# Patient Record
Sex: MALE | Race: WHITE | Employment: FULL TIME | URBAN - METROPOLITAN AREA
[De-identification: names, ages, dates, MRNs, and addresses within clinical notes are randomized per-mention and may not be internally consistent; named-entity substitution may affect disease eponyms.]

---

## 2018-02-27 ENCOUNTER — HOSPITAL ENCOUNTER (OUTPATIENT)
Dept: PREADMISSION TESTING | Age: 43
Discharge: HOME | End: 2018-02-27

## 2018-02-27 VITALS
RESPIRATION RATE: 19 BRPM | TEMPERATURE: 98.3 F | DIASTOLIC BLOOD PRESSURE: 67 MMHG | OXYGEN SATURATION: 98 % | BODY MASS INDEX: 28.47 KG/M2 | HEART RATE: 73 BPM | WEIGHT: 192.24 LBS | SYSTOLIC BLOOD PRESSURE: 121 MMHG | HEIGHT: 69 IN

## 2018-02-27 RX ORDER — IBUPROFEN 200 MG
200 TABLET ORAL
COMMUNITY
End: 2018-03-01

## 2018-02-27 RX ORDER — DULOXETIN HYDROCHLORIDE 60 MG/1
60 CAPSULE, DELAYED RELEASE ORAL
COMMUNITY

## 2018-02-27 RX ORDER — ALBUTEROL SULFATE 0.83 MG/ML
1.25 SOLUTION RESPIRATORY (INHALATION)
COMMUNITY
End: 2018-02-27

## 2018-02-27 RX ORDER — ALBUTEROL SULFATE 90 UG/1
2 AEROSOL, METERED RESPIRATORY (INHALATION)
COMMUNITY

## 2018-02-27 NOTE — PERIOP NOTES
[facility]  PREOPERATIVE INSTRUCTIONS    Surgery Date:   Thursday, 3/1/18. Surgery arrival time given by surgeon: NO  (If [facility] staff will call you between 3pm - 7pm the day before surgery with your arrival time. If your surgery is on a Monday, we will call you the preceding Friday. Please call [phone] after 7pm if you did not receive your arrival time.) Verification phone call on Wednesday, 2/28/18. 1. Report  to the 2nd [address] [facility] on the day of your surgery. Bring your insurance card, photo identification, and any copayment (if applicable). 2. You must have a responsible adult to drive you home and stay with you the first 24 hours after surgery if you are going home the same day of your surgery. 3. Nothing to eat or drink after midnight the night before surgery. This means NO water, gum, mints, coffee, juice, etc.    4. MEDICATIONS TO TAKE THE MORNING OF SURGERY WITH A SIP OF WATER: Cymbalta and USe inhaler prior to coming to the hospital.  5. No alcoholic beverages 24 hours before and after your surgery. 6. If you are being admitted to the hospital,please leave personal belongings/luggage in your car until you have an assigned hospital room number. ( The hospital discharge time is 12 PM NOON. Your adult  should be at the hospital prior to the noon discharge time unless otherwise instructed.)   7. STOP Aspirin and/or any non-steroidal anti-inflammatory drugs Stop motrin today until after surgery. (i.e. Ibuprofen, Naproxen, Advil, Aleve) as directed by your surgeon. You may take Tylenol. Stop herbal supplements 1 week prior to  surgery. 8. If you are currently taking Plavix, Coumadin,or any other blood-thinning/ anticoagulant medication contact your surgeon for instructions. 9. Wear comfortable clothes. Wear your glasses instead of contacts. Please leave all money, jewelry and valuables at home. No make up, particularly mascara, the day of surgery.    10.  REMOVE ALL body piercings, rings,and jewelry and leave at home. Wear your hair loose or down, no pony-tails, buns, or any metal hair clips. 11. If you shower the morning of surgery, please do not apply any lotions, powders, or deodorants afterwards. Do not shave any body area within 24 hours of your surgery. 12. Please follow all instructions to avoid any potential surgical cancellation. 13. Should your physical condition change, (i.e. fever, cold, flu, etc.) please notify your surgeon as soon as possible. 14. It is important to be on time. If a situation occurs where you may be delayed, please call:  [phone] / [phone] on the day of surgery. 15. The Preadmission Testing staff can be reached at 21 [phone]. 16. Special instructions:  Free  parking. Bring completed medication form on day of surgery. 17. The patient was contacted  in person. He  verbalize  understanding of all instructions does not  need reinforcement.

## 2018-02-27 NOTE — H&P
PAT Pre-Op History & Physical    Patient: Laury Douglas                  MRN: [id number]          SSN: [id number]  YOB: 1975          Age: 38 y.o. Sex: male                Subjective:     Gretel Joya is a 38 y.o.  male who presents with history of three years ago suffering from intense back pain. He stated he used to press his shoulder against the door jamb to alleviate his lower back pain. Over the last year he developed left shoulder pain that has gotten progressively worse. Rates pain 2/10 to 4/10 and describes it as a deep ache. Pain is located on the outer aspect of his shoulder down his bicep. He states it will feel like his arm gets dislocated and has to pop back in. Sleep and driving make it worse. He is right hand dominant. He has failed injections, PT, pain gels, heat/ice application and NSAID. The patient was evaluated in the surgeon's office and it was determined that the most appropriate plan of care is to proceed with surgical intervention. Patient's PCP None      Past Medical History:   Diagnosis Date    Adverse effect of anesthesia     Tendecy to wake up being a little anxious.  Asthma     Last used Friday, 2/23/18.  Environmental allergies     Spinal stenosis       Past Surgical History:   Procedure Laterality Date    HX HEENT      wisdom teeth age 23    HX OTHER SURGICAL      3 steroid injections over the last 2 years to lower lumber araea.     AR EGD DELIVER THERMAL ENERGY SPHNCTR/CARDIA GERD      x2           No Known Allergies     Social History   Substance Use Topics    Smoking status: Former Smoker     Packs/day: 0.10     Years: 20.00     Types: Cigarettes     Quit date: 2/27/2013    Smokeless tobacco: Never Used    Alcohol use 1.8 oz/week     2 Glasses of wine, 1 Cans of beer per week      Comment: once a week      History   Drug Use No     Family History   Problem Relation Age of Onset    Asthma Mother     Heart Disease Father Bypass    Other Father      Smoker    No Known Problems Sister     No Known Problems Brother     No Known Problems Sister     No Known Problems Brother          Review of Systems    Patient denies difficulty swallowing, mouth sores, or loose teeth. Patient denies any recent dental procedures or any planned prior to surgery. Patient denies chest pain, tightness, pain radiating down left arm, palpitations. Denies dizziness, visual disturbances, or lightheadedness. Patient denies shortness of breath, wheezing, cough, fever, or chills. Patient denies diarrhea, constipation, or abdominal pain. Patient denies urinary problems including dysuria, hesitancy, urgency, or incontinence. Denies skin breakdown, rashes, insect bites or open area. Objective:   Patient Vitals for the past 24 hrs:   Temp Pulse Resp BP SpO2   18 1510 98.3 °F (36.8 °C) 73 19 121/67 98 %     Temp (24hrs), Av.3 °F (36.8 °C), Min:98.3 °F (36.8 °C), Max:98.3 °F (36.8 °C)    Body mass index is 28.39 kg/(m^2). Wt Readings from Last 1 Encounters:   18 87.2 kg (192 lb 3.9 oz)        Physical Exam:     General: Pleasant,  cooperative, no apparent distress, appears stated age. Eyes: Conjunctivae/corneas clear. EOMs intact. Nose: Nares normal.   Mouth/Throat: Lips, mucosa, and tongue normal. Teeth and gums normal.   Lungs: Clear to auscultation bilaterally. Heart: Regular rate and rhythm, S1, S2 normal. No murmur, click, rub or gallop. Abdomen: Soft, non-tender. Bowel sounds normal. No distention. Musculoskeletal:  Popping sound to left shoulder, limited ROM. Extremities:  Extremities normal, atraumatic, no cyanosis or edema. Calves                                 supple, non tender to palpation. Pulses: 2+ and symmetric bilateral upper extremities. Cap. refill <2 seconds   Skin: Skin color, texture, turgor normal. No visible open areas, examined fully clothed   Neurologic: CN II-XII grossly intact.   Alert and oriented x3. Labs: None ordered    Assessment:     LEFT SHOULDER IMPINGMENT    Plan:     Scheduled for LEFT SHOULDER ARTHROSCOPIC SUBACROMIAL DECOMPRESSION DISTAL CLAVICLE EXCISION    No labs or EKG needed per anesthesia protocol.      Anjelica Chávez NP

## 2018-02-28 ENCOUNTER — ANESTHESIA EVENT (OUTPATIENT)
Dept: SURGERY | Age: 43
End: 2018-02-28
Payer: COMMERCIAL

## 2018-03-01 ENCOUNTER — ANESTHESIA (OUTPATIENT)
Dept: SURGERY | Age: 43
End: 2018-03-01
Payer: COMMERCIAL

## 2018-03-01 ENCOUNTER — HOSPITAL ENCOUNTER (OUTPATIENT)
Age: 43
Setting detail: OUTPATIENT SURGERY
Discharge: HOME | End: 2018-03-01
Attending: ORTHOPAEDIC SURGERY | Admitting: ORTHOPAEDIC SURGERY
Payer: COMMERCIAL

## 2018-03-01 VITALS
DIASTOLIC BLOOD PRESSURE: 84 MMHG | OXYGEN SATURATION: 100 % | WEIGHT: 192.24 LBS | TEMPERATURE: 98.1 F | HEART RATE: 52 BPM | SYSTOLIC BLOOD PRESSURE: 115 MMHG | BODY MASS INDEX: 28.47 KG/M2 | RESPIRATION RATE: 11 BRPM | HEIGHT: 69 IN

## 2018-03-01 PROCEDURE — 77030020782 HC GWN BAIR PAWS FLX 3M -B

## 2018-03-01 PROCEDURE — 76210000050 HC AMBSU PH II REC 0.5 TO 1 HR: Performed by: ORTHOPAEDIC SURGERY

## 2018-03-01 PROCEDURE — 76030000001 HC AMB SURG OR TIME 1 TO 1.5: Performed by: ORTHOPAEDIC SURGERY

## 2018-03-01 PROCEDURE — 76060000062 HC AMB SURG ANES 1 TO 1.5 HR: Performed by: ORTHOPAEDIC SURGERY

## 2018-03-01 PROCEDURE — 74011000250 HC RX REV CODE- 250

## 2018-03-01 PROCEDURE — 77030018835 HC SOL IRR LR ICUM -A: Performed by: ORTHOPAEDIC SURGERY

## 2018-03-01 PROCEDURE — A4565 SLINGS: HCPCS | Performed by: ORTHOPAEDIC SURGERY

## 2018-03-01 PROCEDURE — 77030014154 HC WRP CLD THER BREG -C: Performed by: ORTHOPAEDIC SURGERY

## 2018-03-01 PROCEDURE — 77030003601 HC NDL NRV BLK BBMI -A

## 2018-03-01 PROCEDURE — 74011250636 HC RX REV CODE- 250/636: Performed by: ANESTHESIOLOGY

## 2018-03-01 PROCEDURE — 64415 NJX AA&/STRD BRCH PLXS IMG: CPT

## 2018-03-01 PROCEDURE — 74011250636 HC RX REV CODE- 250/636

## 2018-03-01 PROCEDURE — 77030008496 HC TBNG ARTHSC IRR S&N -B: Performed by: ORTHOPAEDIC SURGERY

## 2018-03-01 PROCEDURE — 77030013234 HC TRACT SHLDR KT BIOM -B: Performed by: ORTHOPAEDIC SURGERY

## 2018-03-01 PROCEDURE — 76210000040 HC AMBSU PH I REC FIRST 0.5 HR: Performed by: ORTHOPAEDIC SURGERY

## 2018-03-01 PROCEDURE — 77030006878 HC BLD SHV FLL RAD S&N -C: Performed by: ORTHOPAEDIC SURGERY

## 2018-03-01 PROCEDURE — 77030002916 HC SUT ETHLN J&J -A: Performed by: ORTHOPAEDIC SURGERY

## 2018-03-01 PROCEDURE — 77030026438 HC STYL ET INTUB CARD -A: Performed by: ANESTHESIOLOGY

## 2018-03-01 PROCEDURE — 77030008684 HC TU ET CUF COVD -B: Performed by: ANESTHESIOLOGY

## 2018-03-01 PROCEDURE — 77030011930 HC WND ARTHRO ABLT S&N -C: Performed by: ORTHOPAEDIC SURGERY

## 2018-03-01 PROCEDURE — 77030016677 HC BUR SHV ABRD S&N -B: Performed by: ORTHOPAEDIC SURGERY

## 2018-03-01 RX ORDER — LIDOCAINE HYDROCHLORIDE 10 MG/ML
0.1 INJECTION, SOLUTION EPIDURAL; INFILTRATION; INTRACAUDAL; PERINEURAL AS NEEDED
Status: DISCONTINUED | OUTPATIENT
Start: 2018-03-01 | End: 2018-03-01 | Stop reason: HOSPADM

## 2018-03-01 RX ORDER — SODIUM CHLORIDE 0.9 % (FLUSH) 0.9 %
5-10 SYRINGE (ML) INJECTION AS NEEDED
Status: DISCONTINUED | OUTPATIENT
Start: 2018-03-01 | End: 2018-03-01 | Stop reason: HOSPADM

## 2018-03-01 RX ORDER — FENTANYL CITRATE 50 UG/ML
INJECTION, SOLUTION INTRAMUSCULAR; INTRAVENOUS AS NEEDED
Status: DISCONTINUED | OUTPATIENT
Start: 2018-03-01 | End: 2018-03-02 | Stop reason: HOSPADM

## 2018-03-01 RX ORDER — SODIUM CHLORIDE 0.9 % (FLUSH) 0.9 %
5-10 SYRINGE (ML) INJECTION EVERY 8 HOURS
Status: DISCONTINUED | OUTPATIENT
Start: 2018-03-01 | End: 2018-03-01 | Stop reason: HOSPADM

## 2018-03-01 RX ORDER — SUCCINYLCHOLINE CHLORIDE 20 MG/ML
INJECTION INTRAMUSCULAR; INTRAVENOUS AS NEEDED
Status: DISCONTINUED | OUTPATIENT
Start: 2018-03-01 | End: 2018-03-02 | Stop reason: HOSPADM

## 2018-03-01 RX ORDER — SODIUM CHLORIDE, SODIUM LACTATE, POTASSIUM CHLORIDE, CALCIUM CHLORIDE 600; 310; 30; 20 MG/100ML; MG/100ML; MG/100ML; MG/100ML
100 INJECTION, SOLUTION INTRAVENOUS CONTINUOUS
Status: DISCONTINUED | OUTPATIENT
Start: 2018-03-01 | End: 2018-03-01 | Stop reason: HOSPADM

## 2018-03-01 RX ORDER — ONDANSETRON 2 MG/ML
INJECTION INTRAMUSCULAR; INTRAVENOUS AS NEEDED
Status: DISCONTINUED | OUTPATIENT
Start: 2018-03-01 | End: 2018-03-02 | Stop reason: HOSPADM

## 2018-03-01 RX ORDER — HYDROMORPHONE HYDROCHLORIDE 2 MG/ML
.25-1 INJECTION, SOLUTION INTRAMUSCULAR; INTRAVENOUS; SUBCUTANEOUS
Status: DISCONTINUED | OUTPATIENT
Start: 2018-03-01 | End: 2018-03-01 | Stop reason: HOSPADM

## 2018-03-01 RX ORDER — DEXAMETHASONE SODIUM PHOSPHATE 4 MG/ML
INJECTION, SOLUTION INTRA-ARTICULAR; INTRALESIONAL; INTRAMUSCULAR; INTRAVENOUS; SOFT TISSUE AS NEEDED
Status: DISCONTINUED | OUTPATIENT
Start: 2018-03-01 | End: 2018-03-02 | Stop reason: HOSPADM

## 2018-03-01 RX ORDER — PROPOFOL 10 MG/ML
INJECTION, EMULSION INTRAVENOUS AS NEEDED
Status: DISCONTINUED | OUTPATIENT
Start: 2018-03-01 | End: 2018-03-02 | Stop reason: HOSPADM

## 2018-03-01 RX ORDER — CEFAZOLIN SODIUM/WATER 2 G/20 ML
2 SYRINGE (ML) INTRAVENOUS ONCE
Status: DISCONTINUED | OUTPATIENT
Start: 2018-03-01 | End: 2018-03-01 | Stop reason: HOSPADM

## 2018-03-01 RX ORDER — ROPIVACAINE HYDROCHLORIDE 5 MG/ML
INJECTION, SOLUTION EPIDURAL; INFILTRATION; PERINEURAL AS NEEDED
Status: DISCONTINUED | OUTPATIENT
Start: 2018-03-01 | End: 2018-03-02 | Stop reason: HOSPADM

## 2018-03-01 RX ORDER — LIDOCAINE HYDROCHLORIDE 20 MG/ML
INJECTION, SOLUTION EPIDURAL; INFILTRATION; INTRACAUDAL; PERINEURAL AS NEEDED
Status: DISCONTINUED | OUTPATIENT
Start: 2018-03-01 | End: 2018-03-02 | Stop reason: HOSPADM

## 2018-03-01 RX ORDER — ROCURONIUM BROMIDE 10 MG/ML
INJECTION, SOLUTION INTRAVENOUS AS NEEDED
Status: DISCONTINUED | OUTPATIENT
Start: 2018-03-01 | End: 2018-03-02 | Stop reason: HOSPADM

## 2018-03-01 RX ORDER — MIDAZOLAM HYDROCHLORIDE 1 MG/ML
INJECTION, SOLUTION INTRAMUSCULAR; INTRAVENOUS AS NEEDED
Status: DISCONTINUED | OUTPATIENT
Start: 2018-03-01 | End: 2018-03-02 | Stop reason: HOSPADM

## 2018-03-01 RX ADMIN — MIDAZOLAM HYDROCHLORIDE 1 MG: 1 INJECTION, SOLUTION INTRAMUSCULAR; INTRAVENOUS at 14:11

## 2018-03-01 RX ADMIN — ROCURONIUM BROMIDE 5 MG: 10 INJECTION, SOLUTION INTRAVENOUS at 14:38

## 2018-03-01 RX ADMIN — SUCCINYLCHOLINE CHLORIDE 100 MG: 20 INJECTION INTRAMUSCULAR; INTRAVENOUS at 14:39

## 2018-03-01 RX ADMIN — LIDOCAINE HYDROCHLORIDE 40 MG: 20 INJECTION, SOLUTION EPIDURAL; INFILTRATION; INTRACAUDAL; PERINEURAL at 14:38

## 2018-03-01 RX ADMIN — SODIUM CHLORIDE, POTASSIUM CHLORIDE, SODIUM LACTATE AND CALCIUM CHLORIDE: 600; 310; 30; 20 INJECTION, SOLUTION INTRAVENOUS at 14:19

## 2018-03-01 RX ADMIN — ONDANSETRON 4 MG: 2 INJECTION INTRAMUSCULAR; INTRAVENOUS at 15:16

## 2018-03-01 RX ADMIN — MIDAZOLAM HYDROCHLORIDE 1 MG: 1 INJECTION, SOLUTION INTRAMUSCULAR; INTRAVENOUS at 14:14

## 2018-03-01 RX ADMIN — FENTANYL CITRATE 50 MCG: 50 INJECTION, SOLUTION INTRAMUSCULAR; INTRAVENOUS at 14:15

## 2018-03-01 RX ADMIN — PROPOFOL 200 MG: 10 INJECTION, EMULSION INTRAVENOUS at 14:38

## 2018-03-01 RX ADMIN — FENTANYL CITRATE 50 MCG: 50 INJECTION, SOLUTION INTRAMUSCULAR; INTRAVENOUS at 14:10

## 2018-03-01 RX ADMIN — MIDAZOLAM HYDROCHLORIDE 1 MG: 1 INJECTION, SOLUTION INTRAMUSCULAR; INTRAVENOUS at 14:13

## 2018-03-01 RX ADMIN — ROCURONIUM BROMIDE 35 MG: 10 INJECTION, SOLUTION INTRAVENOUS at 14:43

## 2018-03-01 RX ADMIN — MIDAZOLAM HYDROCHLORIDE 2 MG: 1 INJECTION, SOLUTION INTRAMUSCULAR; INTRAVENOUS at 14:10

## 2018-03-01 RX ADMIN — ROPIVACAINE HYDROCHLORIDE 30 ML: 5 INJECTION, SOLUTION EPIDURAL; INFILTRATION; PERINEURAL at 14:20

## 2018-03-01 RX ADMIN — DEXAMETHASONE SODIUM PHOSPHATE 4 MG: 4 INJECTION, SOLUTION INTRA-ARTICULAR; INTRALESIONAL; INTRAMUSCULAR; INTRAVENOUS; SOFT TISSUE at 15:16

## 2018-03-01 NOTE — IP AVS SNAPSHOT
Summary of Care Report The Summary of Care report has been created to help improve care coordination. Users with access to [employer] or [address] (Web-based application) may access additional patient information including the Discharge Summary. If you are not currently a [address] user and need more information, please call the number listed below in the [place] [address] section and ask to be connected with Medical Records. Facility Information Name Address Phone [address] [id number] [phone] Patient Information Patient Name Sex  Melinda Gallagher ([id number]) Male 1975 Discharge Information Admitting Provider Service Area Unit Andrzej Pradhan MD / [facility]. / [phone] Discharge Provider Discharge Date/Time Discharge Disposition Destination (none) 3/1/2018 (Pending) AHR (none) You are allergic to the following No active allergies Current Discharge Medication List  
  
CONTINUE these medications which have NOT CHANGED Dose & Instructions Dispensing Information Comments  
 albuterol 90 mcg/actuation inhaler Commonly known as:  PROVENTIL HFA, VENTOLIN HFA, PROAIR HFA Dose:  2 Puff Take 2 Puffs by inhalation every six (6) hours as needed for Wheezing. Refills:  0 DULoxetine 60 mg capsule Commonly known as:  CYMBALTA Dose:  60 mg Take 60 mg by mouth Daily (before breakfast). Refills:  0 STOP taking these medications Comments MOTRIN  mg tablet Generic drug:  ibuprofen Surgery Information ID Date/Time Status Primary Surgeon All Procedures Location  [id number] 3/1/2018 1500 Unposted Andrzej Pradhan MD LEFT SHOULDER ARTHROSCOPIC SUBACROMIAL DECOMPRESSION DISTAL CLAVICLE EXCISION  [facility] AMBULATORY OR Follow-up Information Follow up With Details Comments Contact Info None   None ([phone]) Patient stated that they have no PCP Discharge Instructions DISCHARGE SUMMARY from your Nurse PATIENT INSTRUCTIONS After general anesthesia or intravenous sedation, for 24 hours or while taking prescription Narcotics: · Limit your activities · Do not drive and operate hazardous machinery · Do not make important personal or business decisions · Do  not drink alcoholic beverages · If you have not urinated within 8 hours after discharge, please contact your surgeon on call. Report the following to your surgeon: 
· Excessive pain, swelling, redness or odor of or around the surgical area · Temperature over 100.5 · Nausea and vomiting lasting longer than 4 hours or if unable to take medications · Any signs of decreased circulation or nerve impairment to extremity: change in color, persistent  numbness, tingling, coldness or increase pain · Any questions [address] Breathing deeply and coughing are very important exercises to do after surgery. Deep breathing and coughing open the little air tubes and air sacks in your lungs. You take deep breaths every day. You may not even notice - it is just something you do when you sigh or yawn. It is a natural exercise you do to keep these air passages open. After surgery, take deep breaths and cough, on purpose. DIRECTIONS: 
· Take 10 to 15 slow deep breaths every hour while awake. · Breathe in deeply, and hold it for 2 seconds. · Exhale slowly through puckered lips, like blowing up a balloon. · After every 4th or 5th deep breath, hug your pillow to your chest or belly and give a hard, deep cough. Yes, it will probably hurt. But doing this exercise is a very important part of healing after surgery. Take your pain medicine to help you do this exercise without too much pain. Coughing and deep breathing help prevent bronchitis and pneumonia after surgery. If you had chest or belly surgery, use a pillow as a \"hug leonila\" and hold it tightly to your chest or belly when you cough. ANKLE PUMPS Ankle pumps increase the circulation of oxygenated blood to your lower extremities and decrease your risk for circulation problems such as blood clots. They also stretch the muscles, tendons and ligaments in your foot and ankle, and prevent joint contracture in the ankle and foot, especially after surgeries on the legs. It is important to do ankle pump exercises regularly after surgery because immobility increases your risk for developing a blood clot. Your doctor may also have you take an Aspirin for the next few days as well. If your doctor did not ask you to take an Aspirin, consult with him before starting Aspirin therapy on your own. The exercise is quite simple. · Slowly point your foot forward, feeling the muscles on the top of your lower leg stretch, and hold this position for 5 seconds. · Next, pull your foot back toward you as far as possible, stretching the calf muscles, and hold that position for 5 seconds. · Repeat with the other foot. · Perform 10 repetitions every hour while awake for both ankles if possible (down and then up with the foot once is one repetition). You should feel gentle stretching of the muscles in your lower leg when doing this exercise. If you feel pain, or your range of motion is limited, don't push too hard. Only go the limit your joint and muscles will let you go. If you have increasing pain, progressively worsening leg warmth or swelling, STOP the exercise and call your doctor. MEDICATION AND  
SIDE EFFECT GUIDE The [address] [place] [address] [place] EFFECT GUIDE was provided to the PATIENT AND CARE PROVIDER.   Information provided includes instruction about drug purpose and common side effects for the following medications:  
· PERCOCET · ASPIRIN These are general instructions for a healthy lifestyle: *   Please give a list of your current medications to your Primary Care Provider. *   Please update this list whenever your medications are discontinued, doses are changed, or new medications (including over-the-counter products) are added. *   Please carry medication information at all times in case of emergency situations. About Smoking No smoking / No tobacco products Avoid exposure to second hand smoke Surgeon General's Warning:  Quitting smoking now greatly reduces serious risk to your health. Obesity, smoking, and sedentary lifestyle greatly increases your risk for illness and disease. A healthy diet, regular physical exercise & weight monitoring are important for maintaining a healthy lifestyle. Congestive Heart Failure You may be retaining fluid if you have a history of heart failure or if you experience any of the following symptoms:  Weight gain of 3 pounds or more overnight or 5 pounds in a week, increased swelling in your hands or feet or shortness of breath while lying flat in bed. Please call your doctor as soon as you notice any of these symptoms; do not wait until your next office visit. Recognize signs and symptoms of STROKE: 
F -  Face looks uneven A -  Arms unable to move or move evenly S -  Speech slurred or non-existent T -  Time-call 911 as soon as signs and symptoms begin-DO NOT go  
       back to bed or wait to see if you get better-TIME IS BRAIN. Warning Signs of HEART ATTACK Call 911 if you have these symptoms: 
 
? Chest discomfort. Most heart attacks involve discomfort in the center of the chest that lasts more than a few minutes, or that goes away and comes back. It can feel like uncomfortable pressure, squeezing, fullness, or pain. ? Discomfort in other areas of the upper body. Symptoms can include pain or discomfort in one or both arms, the back, neck, jaw, or stomach. ? Shortness of breath with or without chest discomfort. ? Other signs may include breaking out in a cold sweat, nausea, or lightheadedness. Don't wait more than five minutes to call [address]! Fast action can save your life. Calling 911 is almost always the fastest way to get lifesaving treatment. Emergency Medical Services staff can begin treatment when they arrive  up to an hour sooner than if someone gets to the hospital by car. The discharge information has been reviewed with the patient and caregiver. Any questions and concerns from the patient and caregiver have been addressed. The patient and caregiver verbalized understanding. Other information in your discharge envelope: 
[x]     PRESCRIPTIONS []     PHYSICAL THERAPY PRESCRIPTION 
[]     APPOINTMENT CARDS [x]     [facility] Pamphlet for block or block with On-Q Catheter from   Anesthesia Service []     Medical device information sheets/pamphlets from their   
[]     School/work excuse note. []     /parent work excuse note. The following personal items collected during your admission are returned to you:  
Dental Appliance: Dental Appliances: None Vision:   
Hearing Aid:   
Jewelry:   
Clothing: Clothing:  (denies valuables. clothing to  locker) Other Valuables:   
Valuables sent to safe:   
 
Chart Review Routing History Recipient Method Report Sent By Angelica Ewing  
 imaging at [facility] Fax: [phone] Fax [facility] IP AMB RESULT REPORT IMAGING Edwin Heading [[id number]] 12/19/2016  8:37 AM 4/7/2016  
 imaging at [facility] Fax: [phone] Fax [facility] IP AMB RESULT REPORT IMAGING Edwin Heading [[id number]] 12/19/2016  9:10 AM 4/7/2016 IMAGING [facility] Fax: [phone] Fax [facility]  AMB RESULT REPORT IMAGING Darcy Current [[id number]] 2/15/2017 10:17 AM 4/7/2016

## 2018-03-01 NOTE — H&P
Date of Surgery Update:  Shimon Serna was seen and examined. History and physical has been reviewed. The patient has been examined.  There have been no significant clinical changes since the completion of the originally dated History and Physical.    Signed By: Belén Mayo MD     March 1, 2018 3:21 PM
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
## 2018-03-01 NOTE — IP AVS SNAPSHOT
[address] [facility] [address] [facility] 
[phone] Patient: Jeevan Zavala MRN: [id number] :1975 About your hospitalization You were admitted on:  2018 You last received care in the:  [facility] PACU You were discharged on:  2018 Why you were hospitalized Your primary diagnosis was:  Not on File Follow-up Information Follow up With Details Comments Contact Info None   None ([phone]) Patient stated that they have no PCP Discharge Orders None A check evin indicates which time of day the medication should be taken. My Medications CONTINUE taking these medications Instructions Each Dose to Equal  
 Morning Noon Evening Bedtime  
 albuterol 90 mcg/actuation inhaler Commonly known as:  PROVENTIL HFA, VENTOLIN HFA, PROAIR HFA Your last dose was: Your next dose is: Take 2 Puffs by inhalation every six (6) hours as needed for Wheezing. 2 Puff DULoxetine 60 mg capsule Commonly known as:  CYMBALTA Your last dose was: Your next dose is: Take 60 mg by mouth Daily (before breakfast). 60 mg  
    
   
   
   
  
  
STOP taking these medications MOTRIN  mg tablet Generic drug:  ibuprofen Discharge Instructions DISCHARGE SUMMARY from your Nurse PATIENT INSTRUCTIONS After general anesthesia or intravenous sedation, for 24 hours or while taking prescription Narcotics: · Limit your activities · Do not drive and operate hazardous machinery · Do not make important personal or business decisions · Do  not drink alcoholic beverages · If you have not urinated within 8 hours after discharge, please contact your surgeon on call. Report the following to your surgeon: 
· Excessive pain, swelling, redness or odor of or around the surgical area · Temperature over 100.5 · Nausea and vomiting lasting longer than 4 hours or if unable to take medications · Any signs of decreased circulation or nerve impairment to extremity: change in color, persistent  numbness, tingling, coldness or increase pain · Any questions [address] Breathing deeply and coughing are very important exercises to do after surgery. Deep breathing and coughing open the little air tubes and air sacks in your lungs. You take deep breaths every day. You may not even notice - it is just something you do when you sigh or yawn. It is a natural exercise you do to keep these air passages open. After surgery, take deep breaths and cough, on purpose. DIRECTIONS: 
· Take 10 to 15 slow deep breaths every hour while awake. · Breathe in deeply, and hold it for 2 seconds. · Exhale slowly through puckered lips, like blowing up a balloon. · After every 4th or 5th deep breath, hug your pillow to your chest or belly and give a hard, deep cough. Yes, it will probably hurt. But doing this exercise is a very important part of healing after surgery. Take your pain medicine to help you do this exercise without too much pain. Coughing and deep breathing help prevent bronchitis and pneumonia after surgery. If you had chest or belly surgery, use a pillow as a \"hug leonila\" and hold it tightly to your chest or belly when you cough. ANKLE PUMPS Ankle pumps increase the circulation of oxygenated blood to your lower extremities and decrease your risk for circulation problems such as blood clots. They also stretch the muscles, tendons and ligaments in your foot and ankle, and prevent joint contracture in the ankle and foot, especially after surgeries on the legs. It is important to do ankle pump exercises regularly after surgery because immobility increases your risk for developing a blood clot.   Your doctor may also have you take an Aspirin for the next few days as well. If your doctor did not ask you to take an Aspirin, consult with him before starting Aspirin therapy on your own. The exercise is quite simple. · Slowly point your foot forward, feeling the muscles on the top of your lower leg stretch, and hold this position for 5 seconds. · Next, pull your foot back toward you as far as possible, stretching the calf muscles, and hold that position for 5 seconds. · Repeat with the other foot. · Perform 10 repetitions every hour while awake for both ankles if possible (down and then up with the foot once is one repetition). You should feel gentle stretching of the muscles in your lower leg when doing this exercise. If you feel pain, or your range of motion is limited, don't push too hard. Only go the limit your joint and muscles will let you go. If you have increasing pain, progressively worsening leg warmth or swelling, STOP the exercise and call your doctor. MEDICATION AND  
SIDE EFFECT GUIDE The [address] [place] [address] EFFECT GUIDE was provided to the PATIENT AND CARE PROVIDER. Information provided includes instruction about drug purpose and common side effects for the following medications:  
· PERCOCET · ASPIRIN These are general instructions for a healthy lifestyle: *   Please give a list of your current medications to your Primary Care Provider. *   Please update this list whenever your medications are discontinued, doses are changed, or new medications (including over-the-counter products) are added. *   Please carry medication information at all times in case of emergency situations. About Smoking No smoking / No tobacco products Avoid exposure to second hand smoke Surgeon General's Warning:  Quitting smoking now greatly reduces serious risk to your health. Obesity, smoking, and sedentary lifestyle greatly increases your risk for illness and disease. A healthy diet, regular physical exercise & weight monitoring are important for maintaining a healthy lifestyle. Congestive Heart Failure You may be retaining fluid if you have a history of heart failure or if you experience any of the following symptoms:  Weight gain of 3 pounds or more overnight or 5 pounds in a week, increased swelling in your hands or feet or shortness of breath while lying flat in bed. Please call your doctor as soon as you notice any of these symptoms; do not wait until your next office visit. Recognize signs and symptoms of STROKE: 
F -  Face looks uneven A -  Arms unable to move or move evenly S -  Speech slurred or non-existent T -  Time-call 911 as soon as signs and symptoms begin-DO NOT go  
       back to bed or wait to see if you get better-TIME IS BRAIN. Warning Signs of HEART ATTACK Call 911 if you have these symptoms: 
 
? Chest discomfort. Most heart attacks involve discomfort in the center of the chest that lasts more than a few minutes, or that goes away and comes back. It can feel like uncomfortable pressure, squeezing, fullness, or pain. ? Discomfort in other areas of the upper body. Symptoms can include pain or discomfort in one or both arms, the back, neck, jaw, or stomach. ? Shortness of breath with or without chest discomfort. ? Other signs may include breaking out in a cold sweat, nausea, or lightheadedness. Don't wait more than five minutes to call [address]! Fast action can save your life. Calling 911 is almost always the fastest way to get lifesaving treatment. Emergency Medical Services staff can begin treatment when they arrive  up to an hour sooner than if someone gets to the hospital by car. The discharge information has been reviewed with the patient and caregiver. Any questions and concerns from the patient and caregiver have been addressed. The patient and caregiver verbalized understanding. Other information in your discharge envelope: 
[x]     PRESCRIPTIONS []     PHYSICAL THERAPY PRESCRIPTION 
[]     APPOINTMENT CARDS [x]     Regional Anesthesia Pamphlet for block or block with On-Q Catheter from   Anesthesia Service []     Medical device information sheets/pamphlets from their   
[]     School/work excuse note. []     /parent work excuse note. The following personal items collected during your admission are returned to you:  
Dental Appliance: Dental Appliances: None Vision:   
Hearing Aid:   
Jewelry:   
Clothing: Clothing:  (denies valuables. clothing to  locker) Other Valuables:   
Valuables sent to safe:   
 
  
  
  
Introducing [facility]! Jeffrey Bassett introduces Monogram patient portal. Now you can access parts of your medical record, email your doctor's office, and request medication refills online. 1. In your internet browser, go to [email or web address] 2. Click on the First Time User? Click Here link in the Sign In box. You will see the New Member Sign Up page. 3. Enter your Monogram Access Code exactly as it appears below. You will not need to use this code after youve completed the sign-up process. If you do not sign up before the expiration date, you must request a new code. · Monogram Access Code: [email or web address] Expires: 5/30/2018 12:52 PM 
 
4. Enter the last four digits of your Social Security Number (xxxx) and Date of Birth (mm/dd/yyyy) as indicated and click Submit. You will be taken to the next sign-up page. 5. Create a Monogram ID. This will be your Monogram login ID and cannot be changed, so think of one that is secure and easy to remember. 6. Create a Monogram password. You can change your password at any time. 7. Enter your Password Reset Question and Answer. This can be used at a later time if you forget your password. 8. Enter your e-mail address. You will receive e-mail notification when new information is available in [address] 9. Click Sign Up. You can now view and download portions of your medical record. 10. Click the Download Summary menu link to download a portable copy of your medical information. If you have questions, please visit the Frequently Asked Questions section of the [employer] website. Remember, [employer] is NOT to be used for urgent needs. For medical emergencies, dial 911. Now available from your iPhone and Android! Providers Seen During Your Hospitalization Provider Specialty Primary office phone Darcy Londono MD Orthopedic Surgery [phone] Your Primary Care Physician (PCP) Primary Care Physician Office Phone Office Fax NONE ** None ** ** None ** You are allergic to the following No active allergies Recent Documentation Height Weight BMI Smoking Status 1.753 m 87.2 kg 28.39 kg/m2 Former Smoker Emergency Contacts Name Discharge Info Relation Home Work Mobile Farhana Basilio DISCHARGE CAREGIVER [3] Girlfriend [[phone] Patient Belongings The following personal items are in your possession at time of discharge: 
  Dental Appliances: None                Clothing:  (denies valuables. clothing to  locker) Please provide this summary of care documentation to your next provider. Signatures-by signing, you are acknowledging that this After Visit Summary has been reviewed with you and you have received a copy. Patient Signature:  ____________________________________________________________ Date:  ____________________________________________________________  
  
Phyliss Ghee  Provider Signature: ____________________________________________________________ Date:  ____________________________________________________________

## 2018-03-01 NOTE — ANESTHESIA POSTPROCEDURE EVALUATION
Post-Anesthesia Evaluation and Assessment    Patient: Chichi Amezcua MRN: [id number]  SSN: [id number]    YOB: 1975  Age: 38 y.o. Sex: male       Cardiovascular Function/Vital Signs  Visit Vitals    /81    Pulse (!) 55    Temp 36.7 °C (98.1 °F)    Resp 11    Ht 5' 9\" (1.753 m)    Wt 87.2 kg (192 lb 3.9 oz)    SpO2 100%    BMI 28.39 kg/m2       Patient is status post No value filed. anesthesia for Procedure(s):  LEFT SHOULDER ARTHROSCOPIC SUBACROMIAL DECOMPRESSION DISTAL CLAVICLE EXCISION . Nausea/Vomiting: None    Postoperative hydration reviewed and adequate. Pain:  Pain Scale 1: Numeric (0 - 10) (03/01/18 1542)  Pain Intensity 1: 0 (03/01/18 1542)   Managed    Neurological Status:   Neuro (WDL): Exceptions to WDL (03/01/18 1542)  Neuro  Neurologic State: Drowsy (03/01/18 1542)  LUE Motor Response: Numbness; Pharmacologically paralyzed;Weak (03/01/18 1542)  LLE Motor Response: Purposeful (03/01/18 1542)  RUE Motor Response: Purposeful (03/01/18 1542)  RLE Motor Response: Purposeful (03/01/18 1542)   At baseline    Mental Status and Level of Consciousness: Arousable    Pulmonary Status:   O2 Device: Room air (03/01/18 1542)   Adequate oxygenation and airway patent    Complications related to anesthesia: None    Post-anesthesia assessment completed.  No concerns    Signed By: Errol Christine MD     March 1, 2018

## 2018-03-01 NOTE — DISCHARGE INSTRUCTIONS
DISCHARGE SUMMARY from your Nurse      PATIENT INSTRUCTIONS    After general anesthesia or intravenous sedation, for 24 hours or while taking prescription Narcotics:  · Limit your activities  · Do not drive and operate hazardous machinery  · Do not make important personal or business decisions  · Do  not drink alcoholic beverages  · If you have not urinated within 8 hours after discharge, please contact your surgeon on call. Report the following to your surgeon:  · Excessive pain, swelling, redness or odor of or around the surgical area  · Temperature over 100.5  · Nausea and vomiting lasting longer than 4 hours or if unable to take medications  · Any signs of decreased circulation or nerve impairment to extremity: change in color, persistent  numbness, tingling, coldness or increase pain  · Any questions      COUGH AND DEEP BREATHE    Breathing deeply and coughing are very important exercises to do after surgery. Deep breathing and coughing open the little air tubes and air sacks in your lungs. You take deep breaths every day. You may not even notice - it is just something you do when you sigh or yawn. It is a natural exercise you do to keep these air passages open. After surgery, take deep breaths and cough, on purpose. DIRECTIONS:  · Take 10 to 15 slow deep breaths every hour while awake. · Breathe in deeply, and hold it for 2 seconds. · Exhale slowly through puckered lips, like blowing up a balloon. · After every 4th or 5th deep breath, hug your pillow to your chest or belly and give a hard, deep cough. Yes, it will probably hurt. But doing this exercise is a very important part of healing after surgery. Take your pain medicine to help you do this exercise without too much pain. Coughing and deep breathing help prevent bronchitis and pneumonia after surgery.   If you had chest or belly surgery, use a pillow as a \"hug buddy\" and hold it tightly to your chest or belly when you cough.       ANKLE PUMPS    Ankle pumps increase the circulation of oxygenated blood to your lower extremities and decrease your risk for circulation problems such as blood clots. They also stretch the muscles, tendons and ligaments in your foot and ankle, and prevent joint contracture in the ankle and foot, especially after surgeries on the legs. It is important to do ankle pump exercises regularly after surgery because immobility increases your risk for developing a blood clot. Your doctor may also have you take an Aspirin for the next few days as well. If your doctor did not ask you to take an Aspirin, consult with him before starting Aspirin therapy on your own. The exercise is quite simple. · Slowly point your foot forward, feeling the muscles on the top of your lower leg stretch, and hold this position for 5 seconds. · Next, pull your foot back toward you as far as possible, stretching the calf muscles, and hold that position for 5 seconds. · Repeat with the other foot. · Perform 10 repetitions every hour while awake for both ankles if possible (down and then up with the foot once is one repetition). You should feel gentle stretching of the muscles in your lower leg when doing this exercise. If you feel pain, or your range of motion is limited, don't push too hard. Only go the limit your joint and muscles will let you go. If you have increasing pain, progressively worsening leg warmth or swelling, STOP the exercise and call your doctor. MEDICATION AND   SIDE EFFECT GUIDE    The [facility] MEDICATION AND SIDE EFFECT GUIDE was provided to the PATIENT AND CARE PROVIDER.   Information provided includes instruction about drug purpose and common side effects for the following medications:   · PERCOCET  · ASPIRIN          These are general instructions for a healthy lifestyle:    *   Please give a list of your current medications to your Primary Care Provider. *   Please update this list whenever your medications are discontinued, doses are changed, or new medications (including over-the-counter products) are added. *   Please carry medication information at all times in case of emergency situations. About Smoking  No smoking / No tobacco products  Avoid exposure to second hand smoke     Surgeon General's Warning:  Quitting smoking now greatly reduces serious risk to your health. Obesity, smoking, and sedentary lifestyle greatly increases your risk for illness and disease. A healthy diet, regular physical exercise & weight monitoring are important for maintaining a healthy lifestyle. Congestive Heart Failure  You may be retaining fluid if you have a history of heart failure or if you experience any of the following symptoms:  Weight gain of 3 pounds or more overnight or 5 pounds in a week, increased swelling in your hands or feet or shortness of breath while lying flat in bed. Please call your doctor as soon as you notice any of these symptoms; do not wait until your next office visit. Recognize signs and symptoms of STROKE:  F -  Face looks uneven  A -  Arms unable to move or move evenly  S -  Speech slurred or non-existent  T -  Time-call 911 as soon as signs and symptoms begin-DO NOT go          back to bed or wait to see if you get better-TIME IS BRAIN. Warning Signs of HEART ATTACK   Call 911 if you have these symptoms:     Chest discomfort. Most heart attacks involve discomfort in the center of the chest that lasts more than a few minutes, or that goes away and comes back. It can feel like uncomfortable pressure, squeezing, fullness, or pain.  Discomfort in other areas of the upper body. Symptoms can include pain or discomfort in one or both arms, the back, neck, jaw, or stomach.  Shortness of breath with or without chest discomfort.    Other signs may include breaking out in a cold sweat, nausea, or lightheadedness. Don't wait more than five minutes to call 911 - MINUTES MATTER! Fast action can save your life. Calling 911 is almost always the fastest way to get lifesaving treatment. Emergency Medical Services staff can begin treatment when they arrive -- up to an hour sooner than if someone gets to the hospital by car. The discharge information has been reviewed with the patient and caregiver. Any questions and concerns from the patient and caregiver have been addressed. The patient and caregiver verbalized understanding. Other information in your discharge envelope:  [x]     PRESCRIPTIONS  []     PHYSICAL THERAPY PRESCRIPTION  []     APPOINTMENT CARDS  [x]     Regional Anesthesia Pamphlet for block or block with On-Q Catheter from   Anesthesia Service  []     Medical device information sheets/pamphlets from their    []     School/work excuse note. []     /parent work excuse note. The following personal items collected during your admission are returned to you:   Dental Appliance: Dental Appliances: None  Vision:    Hearing Aid:    Jewelry:    Clothing: Clothing:  (denies valuables. clothing to  locker)  Other Valuables:    Valuables sent to safe:

## 2018-03-19 NOTE — OP NOTES
SHOULDER ARTHROSCOPY NOTE     OPERATIVE REPORT    Patient: Trey Romo CSN: [id number] SSN: [id number]    YOB: 1975  Age: 38 y.o. Sex: male      Admit Date: 3/1/2018  Admit Diagnosis: LEFT SHOULDER IMPINGMENT    Date of Procedure: 3/1/2018   Preoperative Diagnosis: LEFT SHOULDER IMPINGMENT  Postoperative Diagnosis: LEFT SHOULDER IMPINGMENT    Procedure: Procedure(s):  LEFT SHOULDER ARTHROSCOPIC SUBACROMIAL DECOMPRESSION DISTAL CLAVICLE EXCISION   Surgeon: Fabienne Menendez MD  Assistant(s): Octavio Cortes PA-C, ATC  Anesthesia: General   Estimated Blood Loss: <25CC  Specimens: * No specimens in log *   Complications: None  Implants: * No implants in log *      INDICATIONS:  The patient is a 38 y.o., male  who has complained of a long history of left shoulder pain. He has failed conservative treatment and presents for definitive operative care. DESCRIPTION OF PROCEDURE:  After being informed of the risks and benefits, which include, but are not limited to, bleeding, infection, neurovascular damage, wound complications, pain and stiffness in the shoulder, the patient consented for the procedure. After adequate general anesthesia, the left shoulder was prepped and draped in a sterile fashion. A standard posterior arthroscopic portal were established and the shoulder was serially inspected. The surfaces of the shoulder joint were serially inspected. The subscapularis revealed Degenerative fraying of the intraarticular portion The labrum revealed No significant abnormalities. The humeral head articular surface revealed No significant abnormalities. The glenoid articular surface revealed No significant abnormalities. The superior rotator cuff revealed Degenerative fraying involving less than 50% of the tendon thickness. . The posterior rotator cuff revealed Degenerative fraying involving less than 50% of the tendon thickness.  All intraarticular abnormalities were debrided appropriately and articular cartilage lesions were appropriately stabilized if present. The scope was then placed in the subacromial space where a lateral portal was established and a bursectomy was performed. The patient had an impingement lesion and the scope was then placed laterally and a bur posteriorly and a subacromial decompression was performed using a cutting block technique. The distal clavicle was visualized and A distal claviculectomy was performed based on the preoperative history  of AC joint tenderness and well_visualized distal clavicular osteoarthritic changes. The bursal side of the rotator cuff revealed A small area of degenerative fraying which was debrided. A final bursectomy was performed and the scope was removed. The wounds were closed. Sterile dressings were applied and the patient was awakened and taken to the recovery room in stable condition. There were no complications.

## 2018-09-27 ENCOUNTER — HOSPITAL ENCOUNTER (OUTPATIENT)
Dept: GENERAL RADIOLOGY | Age: 43
Discharge: HOME | End: 2018-09-27
Payer: COMMERCIAL

## 2018-09-27 ENCOUNTER — HOSPITAL ENCOUNTER (OUTPATIENT)
Dept: MRI IMAGING | Age: 43
Discharge: HOME | End: 2018-09-27
Payer: COMMERCIAL

## 2018-09-27 DIAGNOSIS — M54.16 LUMBAR RADICULOPATHY: ICD-10-CM

## 2018-09-27 PROCEDURE — 72148 MRI LUMBAR SPINE W/O DYE: CPT

## 2018-09-27 PROCEDURE — 72114 X-RAY EXAM L-S SPINE BENDING: CPT

## (undated) DEVICE — PACK,SHOULDER,DRAPE,POUCH: Brand: MEDLINE

## (undated) DEVICE — 3M™ STERI-DRAPE™ U-DRAPE 1015: Brand: STERI-DRAPE™

## (undated) DEVICE — CURITY NON-ADHERENT STRIPS: Brand: CURITY

## (undated) DEVICE — SLING ORTHOPEDIC PCH UNIV 19.5X9 IN 2-39 IN ARM W/ FOAM STRP

## (undated) DEVICE — SUPER TURBOVAC 90 INTEGRATED CABLE WAND ICW: Brand: COBLATION

## (undated) DEVICE — GAUZE SPONGES,12 PLY: Brand: CURITY

## (undated) DEVICE — (D)PREP SKN CHLRAPRP APPL 26ML -- CONVERT TO ITEM 371833

## (undated) DEVICE — STERILE POLYISOPRENE POWDER-FREE SURGICAL GLOVES: Brand: PROTEXIS

## (undated) DEVICE — BURR 4.5 HELICUT LG MOUTH DSPL DYO PWR: Brand: DYONICS / HELICUT

## (undated) DEVICE — DRAPE,REIN 53X77,STERILE: Brand: MEDLINE

## (undated) DEVICE — 4-PORT MANIFOLD: Brand: NEPTUNE 2

## (undated) DEVICE — SUT ETHLN 3-0 18IN PS1 BLK --

## (undated) DEVICE — KIT SHLDR TRAC CUST

## (undated) DEVICE — ABDOMINAL PAD: Brand: DERMACEA

## (undated) DEVICE — STERILE POLYISOPRENE POWDER-FREE SURGICAL GLOVES WITH EMOLLIENT COATING: Brand: PROTEXIS

## (undated) DEVICE — GOWN,BREATHABLE,IMP SLV 3XL AURORA: Brand: MEDLINE

## (undated) DEVICE — DEVON™ KNEE AND BODY STRAP 60" X 3" (1.5 M X 7.6 CM): Brand: DEVON

## (undated) DEVICE — UNIT THER COMB CRYO W/ PD SHLDR W/ TUBE PWR OPERATED W/ BD

## (undated) DEVICE — 4.5 FULL RADIUS BONECUTTER BLADES,                                    YELLOW, 8000 MAXIMUM RPM, PACKAGED 6                                    PER BOX, STERILE

## (undated) DEVICE — BOOT ORTH XL KNEE GRN TYVEK HI CVR SKID RESIST HEAT SEAL E

## (undated) DEVICE — DUAL IRRIGATION ADAPTOR

## (undated) DEVICE — SOLUTION IRRIG 3000ML LAC R FLX CONT

## (undated) DEVICE — ARTHROSCOPY RICHMOND-LF: Brand: MEDLINE INDUSTRIES, INC.

## (undated) DEVICE — DYONICS 25 INFLOW TUBE SET, 3 PER BOX

## (undated) DEVICE — COVER LT HNDL BLU PLAS